# Patient Record
Sex: MALE | Race: WHITE | Employment: OTHER | URBAN - NONMETROPOLITAN AREA
[De-identification: names, ages, dates, MRNs, and addresses within clinical notes are randomized per-mention and may not be internally consistent; named-entity substitution may affect disease eponyms.]

---

## 2017-01-26 ENCOUNTER — OFFICE VISIT (OUTPATIENT)
Dept: CARDIOLOGY | Age: 72
End: 2017-01-26

## 2017-01-26 VITALS
WEIGHT: 194 LBS | BODY MASS INDEX: 29.4 KG/M2 | HEIGHT: 68 IN | DIASTOLIC BLOOD PRESSURE: 80 MMHG | SYSTOLIC BLOOD PRESSURE: 138 MMHG

## 2017-01-26 DIAGNOSIS — F17.200 SMOKER: ICD-10-CM

## 2017-01-26 DIAGNOSIS — I25.10 CORONARY ARTERY DISEASE INVOLVING NATIVE HEART WITHOUT ANGINA PECTORIS, UNSPECIFIED VESSEL OR LESION TYPE: ICD-10-CM

## 2017-01-26 DIAGNOSIS — I73.9 CLAUDICATION (HCC): Primary | ICD-10-CM

## 2017-01-26 DIAGNOSIS — I10 ESSENTIAL HYPERTENSION: ICD-10-CM

## 2017-01-26 DIAGNOSIS — Z95.1 HX OF CABG: ICD-10-CM

## 2017-01-26 DIAGNOSIS — E78.5 DYSLIPIDEMIA: ICD-10-CM

## 2017-01-26 PROCEDURE — 99213 OFFICE O/P EST LOW 20 MIN: CPT | Performed by: INTERNAL MEDICINE

## 2017-02-02 ENCOUNTER — TELEPHONE (OUTPATIENT)
Dept: CARDIOLOGY | Age: 72
End: 2017-02-02

## 2017-02-02 DIAGNOSIS — I73.9 PVD (PERIPHERAL VASCULAR DISEASE) (HCC): Primary | ICD-10-CM

## 2017-02-02 DIAGNOSIS — R68.89 ABNORMAL ANKLE BRACHIAL INDEX: ICD-10-CM

## 2017-03-06 ENCOUNTER — OFFICE VISIT (OUTPATIENT)
Dept: CARDIOLOGY | Age: 72
End: 2017-03-06

## 2017-03-06 VITALS
HEIGHT: 68 IN | BODY MASS INDEX: 29.43 KG/M2 | DIASTOLIC BLOOD PRESSURE: 78 MMHG | WEIGHT: 194.2 LBS | HEART RATE: 70 BPM | SYSTOLIC BLOOD PRESSURE: 138 MMHG

## 2017-03-06 DIAGNOSIS — E78.5 DYSLIPIDEMIA: ICD-10-CM

## 2017-03-06 DIAGNOSIS — I73.9 PAD (PERIPHERAL ARTERY DISEASE) (HCC): Primary | ICD-10-CM

## 2017-03-06 DIAGNOSIS — Z95.1 HX OF CABG: ICD-10-CM

## 2017-03-06 DIAGNOSIS — F17.200 SMOKER: ICD-10-CM

## 2017-03-06 DIAGNOSIS — I10 ESSENTIAL HYPERTENSION: ICD-10-CM

## 2017-03-06 PROCEDURE — 99406 BEHAV CHNG SMOKING 3-10 MIN: CPT | Performed by: PHYSICIAN ASSISTANT

## 2017-03-06 PROCEDURE — 99213 OFFICE O/P EST LOW 20 MIN: CPT | Performed by: PHYSICIAN ASSISTANT

## 2017-06-12 RX ORDER — CLOPIDOGREL BISULFATE 75 MG/1
TABLET ORAL
Qty: 30 TABLET | Refills: 4 | Status: SHIPPED | OUTPATIENT
Start: 2017-06-12

## 2017-10-05 ENCOUNTER — OFFICE VISIT (OUTPATIENT)
Dept: CARDIOLOGY CLINIC | Age: 72
End: 2017-10-05
Payer: MEDICARE

## 2017-10-05 VITALS
HEIGHT: 68 IN | HEART RATE: 65 BPM | WEIGHT: 197.8 LBS | BODY MASS INDEX: 29.98 KG/M2 | DIASTOLIC BLOOD PRESSURE: 70 MMHG | SYSTOLIC BLOOD PRESSURE: 132 MMHG

## 2017-10-05 DIAGNOSIS — Z87.891 EX-SMOKER: ICD-10-CM

## 2017-10-05 DIAGNOSIS — Z95.1 HX OF CABG: ICD-10-CM

## 2017-10-05 DIAGNOSIS — I10 ESSENTIAL HYPERTENSION: ICD-10-CM

## 2017-10-05 DIAGNOSIS — M17.11 PRIMARY OSTEOARTHRITIS OF RIGHT KNEE: ICD-10-CM

## 2017-10-05 DIAGNOSIS — I73.9 PAD (PERIPHERAL ARTERY DISEASE) (HCC): ICD-10-CM

## 2017-10-05 DIAGNOSIS — E78.5 DYSLIPIDEMIA: ICD-10-CM

## 2017-10-05 DIAGNOSIS — Z01.818 PRE-OPERATIVE CLEARANCE: Primary | ICD-10-CM

## 2017-10-05 DIAGNOSIS — I25.10 CORONARY ARTERY DISEASE INVOLVING NATIVE CORONARY ARTERY OF NATIVE HEART WITHOUT ANGINA PECTORIS: ICD-10-CM

## 2017-10-05 PROCEDURE — 99213 OFFICE O/P EST LOW 20 MIN: CPT | Performed by: INTERNAL MEDICINE

## 2017-10-05 PROCEDURE — 93000 ELECTROCARDIOGRAM COMPLETE: CPT | Performed by: INTERNAL MEDICINE

## 2017-10-05 NOTE — MR AVS SNAPSHOT
After Visit Summary             Haley Riojas   10/5/2017 11:00 AM   Office Visit    Description:  Male : 1945   Provider:  Corrinne Blanks, MD   Department:  [facility] [place] [address] and Future Appointments         Below is a list of your follow-up and future appointments. This may not be a complete list as you may have made appointments directly with providers that we are not aware of or your providers may have made some for you. Please call your providers to confirm appointments. It is important to keep your appointments. Please bring your current insurance card, photo ID, co-pay, and all medication bottles to your appointment. If self-pay, payment is expected at the time of service. Your To-Do List     Future Appointments Provider Department Dept Phone    2017 9:45 AM Corrinne Blanks, MD [facility] [phone]    Please arrive 15 minutes prior to appointment, bring photo ID and insurance card. Please arrive 15 minutes prior to appointment, bring photo ID and insurance card. Follow-Up    Return in about 2 months (around 2017). Information from Your Visit        Department     Name Address Phone Fax    [facility] 1  [facility].[address] [phone]      You Were Seen for:         Comments    Pre-operative clearance   [[id number]]         Vital Signs     Blood Pressure Pulse Height Weight Body Mass Index Smoking Status    132/70 65 5' 8\" (1.727 m) 197 lb 12.8 oz (89.7 kg) 30.08 kg/m2 Current Every Day Smoker      Additional Information about your Body Mass Index (BMI)           Your BMI as listed above is considered obese (30 or more). BMI is an estimate of body fat, calculated from your height and weight.   The higher your BMI, the greater your risk of heart disease, high blood pressure, type 2 diabetes, stroke, gallstones, arthritis, sleep apnea, and certain cancers. BMI is not perfect. It may overestimate body fat in athletes and people who are more muscular. Even a small weight loss (between 5 and 10 percent of your current weight) by decreasing your calorie intake and becoming more physically active will help lower your risk of developing or worsening diseases associated with obesity.      Learn more at: [email or web address]             Medications and Orders      Your Current Medications Are              clopidogrel (PLAVIX) 75 MG tablet TAKE 1 TABLET BY MOUTH DAILY    mupirocin (BACTROBAN) 2 % ointment Apply topically     Multiple Vitamins-Minerals (MULTIVITAMIN ADULTS 50+ PO) Take by mouth daily    Cholecalciferol (VITAMIN D3) 1000 UNITS CAPS Take by mouth daily    metFORMIN (GLUCOPHAGE) 500 MG tablet Take 500 mg by mouth 2 times daily     Niacin ER 1000 MG TBCR Take 2 tablets by mouth nightly     lovastatin (MEVACOR) 20 MG tablet Take 20 mg by mouth nightly    amLODIPine (NORVASC) 10 MG tablet Take 10 mg by mouth daily    aspirin 81 MG EC tablet Take 81 mg by mouth daily    lansoprazole (PREVACID SOLUTAB) 30 MG disintegrating tablet Take 30 mg by mouth every other day     metoprolol tartrate (LOPRESSOR) 50 MG tablet Take 50 mg by mouth 2 times daily Take 1/2 tab po bid     ranitidine (ZANTAC) 150 MG capsule Take 150 mg by mouth every other day       Allergies              Sulfa Antibiotics     Temp goes below normal per pt      We Ordered/Performed the following           EKG 12 Lead          Result Summary for EKG 12 Lead      Result Information     Status          Final result (Resulted: 10/5/2017)           10/5/2017  9:10 AM      Scans on Order [id number]            ECG on 10/5/2017  9:10 AM : ECG Report                     Additional Information        Basic Information     Date Of Birth Sex Race Ethnicity Preferred Language    1945 Male White Non-/Non  English      Problem List as of 10/5/2017 Peripheral vascular disease (HCC)    CAD (coronary artery disease)      Preventive Care        Date Due    One-time abdominal aortic aneurism (AAA) screening is recommended for all men between the age of 73-68 who have ever smoked 1945    Hepatitis C screening is recommended for all adults regardless of risk factors born between [facility] at least once (lifetime) who have never been tested. 1945    Tetanus Combination Vaccine (1 - Tdap) 1/31/1964    Cholesterol Screening 1/31/1985    Zoster Vaccine 1/31/2005    Pneumococcal Vaccines (two) for all adults aged 72 and over (1 of 2 - PCV13) 1/31/2010    Yearly Flu Vaccine (1) 9/1/2017    Colonoscopy 8/4/2019            MyChart Signup           [employer] allows you to send messages to your doctor, view your test results, renew your prescriptions, schedule appointments, view visit notes, and more. How Do I Sign Up? 1. In your Internet browser, go to [email or web address]  2. Click on the Sign Up Now link in the Sign In box. You will see the New Member Sign Up page. 3. Enter your [employer] Access Code exactly as it appears below. You will not need to use this code after youve completed the sign-up process. If you do not sign up before the expiration date, you must request a new code. [employer] Access Code: [email or web address]  Expires: 12/4/2017  9:06 AM    4. Enter your Social Security Number ([id number]) and Date of Birth (mm/dd/yyyy) as indicated and click Submit. You will be taken to the next sign-up page. 5. Create a [employer] ID. This will be your [employer] login ID and cannot be changed, so think of one that is secure and easy to remember. 6. Create a [employer] password. You can change your password at any time. 7. Enter your Password Reset Question and Answer. This can be used at a later time if you forget your password. 8. Enter your e-mail address.  You will receive e-mail notification when new information is available in [email or web address]. 9. Click Sign Up. You can now view your medical record. Additional Information  If you have questions, please contact the physician practice where you receive care. Remember, [email or web address] is NOT to be used for urgent needs. For medical emergencies, dial 911. For questions regarding your [email or web address] account call [phone]. If you have a clinical question, please call your doctor's office.

## 2017-10-05 NOTE — PROGRESS NOTES
This patient is followed regularly by Dr. Laurie Roe MD.      Chief Complaint   Patient presents with    Follow-up     9 month f/u    Cardiac Clearance     right knee Dr. Yumi Lindsey Oct 16         Current Outpatient Prescriptions   Medication Sig Dispense Refill    clopidogrel (PLAVIX) 75 MG tablet TAKE 1 TABLET BY MOUTH DAILY 30 tablet 4    mupirocin (BACTROBAN) 2 % ointment Apply topically       Multiple Vitamins-Minerals (MULTIVITAMIN ADULTS 50+ PO) Take by mouth daily      Cholecalciferol (VITAMIN D3) 1000 UNITS CAPS Take by mouth daily      metFORMIN (GLUCOPHAGE) 500 MG tablet Take 500 mg by mouth 2 times daily       Niacin ER 1000 MG TBCR Take 2 tablets by mouth nightly       lovastatin (MEVACOR) 20 MG tablet Take 20 mg by mouth nightly      amLODIPine (NORVASC) 10 MG tablet Take 10 mg by mouth daily      aspirin 81 MG EC tablet Take 81 mg by mouth daily      lansoprazole (PREVACID SOLUTAB) 30 MG disintegrating tablet Take 30 mg by mouth every other day       metoprolol tartrate (LOPRESSOR) 50 MG tablet Take 50 mg by mouth 2 times daily Take 1/2 tab po bid       ranitidine (ZANTAC) 150 MG capsule Take 150 mg by mouth every other day        No current facility-administered medications for this visit. Review of Systems - General ROS: negative  Psychological ROS: negative  Hematological and Lymphatic ROS: No history of blood clots or bleeding disorder.    Respiratory ROS: no cough, shortness of breath, or wheezing  Cardiovascular ROS: no chest pain or dyspnea on exertion  Gastrointestinal ROS: negative  Genito-Urinary ROS: no dysuria, trouble voiding, or hematuria  Musculoskeletal ROS: rt knee pain  Neurological ROS: no TIA or stroke symptoms  Dermatological ROS: negative      /70  Pulse 65  Ht 5' 8\" (1.727 m)  Wt 197 lb 12.8 oz (89.7 kg)  BMI 30.08 kg/m2      Physical Examination: General appearance - alert, well appearing, and in no distress  Mental status - alert, oriented to

## 2017-12-21 ENCOUNTER — OFFICE VISIT (OUTPATIENT)
Dept: CARDIOLOGY CLINIC | Age: 72
End: 2017-12-21
Payer: MEDICARE

## 2017-12-21 VITALS
HEART RATE: 71 BPM | WEIGHT: 192 LBS | HEIGHT: 68 IN | SYSTOLIC BLOOD PRESSURE: 137 MMHG | BODY MASS INDEX: 29.1 KG/M2 | DIASTOLIC BLOOD PRESSURE: 72 MMHG

## 2017-12-21 DIAGNOSIS — I73.9 CLAUDICATION (HCC): Primary | ICD-10-CM

## 2017-12-21 DIAGNOSIS — Z95.1 HX OF CABG: ICD-10-CM

## 2017-12-21 DIAGNOSIS — E78.5 DYSLIPIDEMIA: ICD-10-CM

## 2017-12-21 DIAGNOSIS — Z87.891 EX-SMOKER: ICD-10-CM

## 2017-12-21 DIAGNOSIS — I10 ESSENTIAL HYPERTENSION: ICD-10-CM

## 2017-12-21 DIAGNOSIS — I73.9 PAD (PERIPHERAL ARTERY DISEASE) (HCC): ICD-10-CM

## 2017-12-21 DIAGNOSIS — I25.10 CORONARY ARTERY DISEASE INVOLVING NATIVE CORONARY ARTERY OF NATIVE HEART WITHOUT ANGINA PECTORIS: ICD-10-CM

## 2017-12-21 PROCEDURE — 99213 OFFICE O/P EST LOW 20 MIN: CPT | Performed by: INTERNAL MEDICINE

## 2017-12-21 NOTE — PROGRESS NOTES
Patient complains of leg cramps. No chest pain, no shortness of breath, no edema, no palpitations, no vertigo.

## 2017-12-21 NOTE — PROGRESS NOTES
This patient is followed regularly by Dr. Olman Garcia MD.      Chief Complaint   Patient presents with    Coronary Artery Disease     2 mth follow up         Current Outpatient Prescriptions   Medication Sig Dispense Refill    clopidogrel (PLAVIX) 75 MG tablet TAKE 1 TABLET BY MOUTH DAILY 30 tablet 4    mupirocin (BACTROBAN) 2 % ointment Apply topically       Multiple Vitamins-Minerals (MULTIVITAMIN ADULTS 50+ PO) Take by mouth daily      Cholecalciferol (VITAMIN D3) 1000 UNITS CAPS Take by mouth daily      metFORMIN (GLUCOPHAGE) 500 MG tablet Take 500 mg by mouth 2 times daily       Niacin ER 1000 MG TBCR Take 2 tablets by mouth nightly       lovastatin (MEVACOR) 20 MG tablet Take 20 mg by mouth nightly      amLODIPine (NORVASC) 10 MG tablet Take 10 mg by mouth daily      aspirin 81 MG EC tablet Take 81 mg by mouth daily      lansoprazole (PREVACID SOLUTAB) 30 MG disintegrating tablet Take 30 mg by mouth every other day       metoprolol tartrate (LOPRESSOR) 50 MG tablet Take 50 mg by mouth 2 times daily Take 1/2 tab po bid       ranitidine (ZANTAC) 150 MG capsule Take 150 mg by mouth every other day        No current facility-administered medications for this visit. Review of Systems - General ROS: negative  Psychological ROS: negative  Hematological and Lymphatic ROS: No history of blood clots or bleeding disorder.    Respiratory ROS: no cough, shortness of breath, or wheezing  Cardiovascular ROS: no chest pain or dyspnea on exertion  Gastrointestinal ROS: negative  Genito-Urinary ROS: no dysuria, trouble voiding, or hematuria  Musculoskeletal ROS: negative  Neurological ROS: no TIA or stroke symptoms  Dermatological ROS: negative  C/o claudication       /72   Pulse 71   Ht 5' 8\" (1.727 m)   Wt 192 lb (87.1 kg)   BMI 29.19 kg/m²       Physical Examination: General appearance - alert, well appearing, and in no distress  Mental status - alert, oriented to person, place, and Assessment/Plan:      S/P knee surgery   No lana op cardiac events. Peripheral vascular disease  C/o claudication  H/o intervention with 6.0-40 mm stent insertion and angioplasty for right SFA 90% proximal lesion. Get CALDERON    Coronary artery disease  CABG  Seems to be stable. Denies angina or change in breathing pattern. Continue ASA/BB/Statin/norvasc/plavix. Hypertension, on medical treatment. Seems to be under good control. Patient is compliant with medical treatment. Dyslipidemia: On statin, followed periodically. Patient need periodic lipid and liver profile. Patient is a advised to have their lipids and liver panel checked through family doctor. The therapeutic values that need to be met are also presented to the patient and need to achieve them is emphasized and patient agrees and accepts. Will schedule follow up appointment in 1 year.

## 2017-12-26 ENCOUNTER — TELEPHONE (OUTPATIENT)
Dept: CARDIOLOGY CLINIC | Age: 72
End: 2017-12-26

## 2017-12-26 NOTE — TELEPHONE ENCOUNTER
Pt no showed harris's   Pt called, states didn't know about test, state had no phone call or letter informing, also states told someone didn't want test til after first of year    No notes on order stating after 1st of year  Notes did say tried to contact pt but no voice mail set up and instructions were mailed to pt    harris rescheduled to 01-04-18 @ 10:00am  Pt on line when scheduled, instructions given.

## 2018-01-04 ENCOUNTER — HOSPITAL ENCOUNTER (OUTPATIENT)
Dept: INTERVENTIONAL RADIOLOGY/VASCULAR | Age: 73
Discharge: HOME | End: 2018-01-04
Payer: MEDICARE

## 2018-01-04 DIAGNOSIS — I73.9 CLAUDICATION (HCC): ICD-10-CM

## 2018-01-04 PROCEDURE — 93925 LOWER EXTREMITY STUDY: CPT

## 2018-01-15 ENCOUNTER — TELEPHONE (OUTPATIENT)
Dept: CARDIOLOGY CLINIC | Age: 73
End: 2018-01-15

## 2018-01-17 ENCOUNTER — OFFICE VISIT (OUTPATIENT)
Dept: CARDIOLOGY CLINIC | Age: 73
End: 2018-01-17
Payer: MEDICARE

## 2018-01-17 VITALS
SYSTOLIC BLOOD PRESSURE: 124 MMHG | HEART RATE: 88 BPM | BODY MASS INDEX: 31.02 KG/M2 | WEIGHT: 204 LBS | DIASTOLIC BLOOD PRESSURE: 64 MMHG

## 2018-01-17 DIAGNOSIS — E78.5 DYSLIPIDEMIA: ICD-10-CM

## 2018-01-17 DIAGNOSIS — I25.10 CORONARY ARTERY DISEASE INVOLVING NATIVE CORONARY ARTERY OF NATIVE HEART WITHOUT ANGINA PECTORIS: ICD-10-CM

## 2018-01-17 DIAGNOSIS — I73.9 CLAUDICATION (HCC): Primary | ICD-10-CM

## 2018-01-17 DIAGNOSIS — I10 ESSENTIAL HYPERTENSION: ICD-10-CM

## 2018-01-17 DIAGNOSIS — I73.9 PAD (PERIPHERAL ARTERY DISEASE) (HCC): ICD-10-CM

## 2018-01-17 PROCEDURE — G8598 ASA/ANTIPLAT THER USED: HCPCS | Performed by: INTERNAL MEDICINE

## 2018-01-17 PROCEDURE — 1123F ACP DISCUSS/DSCN MKR DOCD: CPT | Performed by: INTERNAL MEDICINE

## 2018-01-17 PROCEDURE — 99213 OFFICE O/P EST LOW 20 MIN: CPT | Performed by: INTERNAL MEDICINE

## 2018-01-17 PROCEDURE — G8484 FLU IMMUNIZE NO ADMIN: HCPCS | Performed by: INTERNAL MEDICINE

## 2018-01-17 PROCEDURE — 3017F COLORECTAL CA SCREEN DOC REV: CPT | Performed by: INTERNAL MEDICINE

## 2018-01-17 PROCEDURE — G8427 DOCREV CUR MEDS BY ELIG CLIN: HCPCS | Performed by: INTERNAL MEDICINE

## 2018-01-17 PROCEDURE — G8417 CALC BMI ABV UP PARAM F/U: HCPCS | Performed by: INTERNAL MEDICINE

## 2018-01-17 PROCEDURE — 4040F PNEUMOC VAC/ADMIN/RCVD: CPT | Performed by: INTERNAL MEDICINE

## 2018-01-17 PROCEDURE — 4004F PT TOBACCO SCREEN RCVD TLK: CPT | Performed by: INTERNAL MEDICINE

## 2018-01-24 ENCOUNTER — TELEPHONE (OUTPATIENT)
Dept: CARDIOLOGY CLINIC | Age: 73
End: 2018-01-24

## 2018-01-24 DIAGNOSIS — I25.10 CORONARY ARTERY DISEASE INVOLVING NATIVE HEART WITHOUT ANGINA PECTORIS, UNSPECIFIED VESSEL OR LESION TYPE: ICD-10-CM

## 2018-01-24 DIAGNOSIS — I73.9 PERIPHERAL VASCULAR DISEASE (HCC): Primary | ICD-10-CM

## 2018-02-06 RX ORDER — SODIUM CHLORIDE 450 MG/100ML
INJECTION, SOLUTION INTRAVENOUS CONTINUOUS
Status: CANCELLED | OUTPATIENT
Start: 2018-02-06

## 2018-02-06 RX ORDER — MIDAZOLAM HYDROCHLORIDE 1 MG/ML
1 INJECTION INTRAMUSCULAR; INTRAVENOUS ONCE
Status: CANCELLED | OUTPATIENT
Start: 2018-02-06 | End: 2018-02-06

## 2018-02-06 RX ORDER — FENTANYL CITRATE 50 UG/ML
50 INJECTION, SOLUTION INTRAMUSCULAR; INTRAVENOUS ONCE
Status: CANCELLED | OUTPATIENT
Start: 2018-02-06 | End: 2018-02-06

## 2018-02-07 ENCOUNTER — HOSPITAL ENCOUNTER (OUTPATIENT)
Dept: INTERVENTIONAL RADIOLOGY/VASCULAR | Age: 73
Discharge: HOME | End: 2018-02-07
Payer: MEDICARE

## 2018-02-07 ENCOUNTER — HOSPITAL ENCOUNTER (OUTPATIENT)
Dept: INPATIENT UNIT | Age: 73
Discharge: HOME | End: 2018-02-08
Attending: INTERNAL MEDICINE | Admitting: INTERNAL MEDICINE
Payer: MEDICARE

## 2018-02-07 VITALS
RESPIRATION RATE: 17 BRPM | HEART RATE: 69 BPM | OXYGEN SATURATION: 95 % | DIASTOLIC BLOOD PRESSURE: 88 MMHG | SYSTOLIC BLOOD PRESSURE: 137 MMHG

## 2018-02-07 DIAGNOSIS — I70.219 ATHEROSCLEROTIC PVD WITH INTERMITTENT CLAUDICATION (HCC): ICD-10-CM

## 2018-02-07 DIAGNOSIS — I73.9 PERIPHERAL VASCULAR DISEASE (HCC): ICD-10-CM

## 2018-02-07 PROBLEM — R93.89: Status: ACTIVE | Noted: 2018-02-07

## 2018-02-07 LAB
ABO: NORMAL
ACTIVATED CLOTTING TIME: 153 SECONDS (ref 1–150)
ACTIVATED CLOTTING TIME: 180 SECONDS (ref 1–150)
ANTIBODY SCREEN: NORMAL
APTT: 34.3 SECONDS (ref 22–38)
CREAT SERPL-MCNC: 0.7 MG/DL (ref 0.4–1.2)
GFR SERPL CREATININE-BSD FRML MDRD: > 90 ML/MIN/1.73M2
HCT VFR BLD CALC: 40 % (ref 42–52)
HEMOGLOBIN: 14.1 GM/DL (ref 14–18)
INR BLD: 1.02 (ref 0.85–1.13)
MCH RBC QN AUTO: 34.4 PG (ref 27–31)
MCHC RBC AUTO-ENTMCNC: 35.2 GM/DL (ref 33–37)
MCV RBC AUTO: 97.5 FL (ref 80–94)
PDW BLD-RTO: 13.3 % (ref 11.5–14.5)
PLATELET # BLD: 138 THOU/MM3 (ref 130–400)
PMV BLD AUTO: 10.5 FL (ref 7.4–10.4)
RBC # BLD: 4.1 MILL/MM3 (ref 4.7–6.1)
RH FACTOR: NORMAL
WBC # BLD: 8.5 THOU/MM3 (ref 4.8–10.8)

## 2018-02-07 PROCEDURE — 2580000003 HC RX 258: Performed by: INTERNAL MEDICINE

## 2018-02-07 PROCEDURE — 36415 COLL VENOUS BLD VENIPUNCTURE: CPT

## 2018-02-07 PROCEDURE — 2720000010 HC SURG SUPPLY STERILE

## 2018-02-07 PROCEDURE — 37224 HC PLASTY UNI FEMPOP: CPT | Performed by: INTERNAL MEDICINE

## 2018-02-07 PROCEDURE — 2500000003 HC RX 250 WO HCPCS

## 2018-02-07 PROCEDURE — 86901 BLOOD TYPING SEROLOGIC RH(D): CPT

## 2018-02-07 PROCEDURE — 85027 COMPLETE CBC AUTOMATED: CPT

## 2018-02-07 PROCEDURE — 2500000003 HC RX 250 WO HCPCS: Performed by: INTERNAL MEDICINE

## 2018-02-07 PROCEDURE — C1894 INTRO/SHEATH, NON-LASER: HCPCS

## 2018-02-07 PROCEDURE — 86850 RBC ANTIBODY SCREEN: CPT

## 2018-02-07 PROCEDURE — 6370000000 HC RX 637 (ALT 250 FOR IP): Performed by: INTERNAL MEDICINE

## 2018-02-07 PROCEDURE — 37224 PR REVSC OPN/PRG FEM/POP W/ANGIOPLASTY UNI: CPT | Performed by: INTERNAL MEDICINE

## 2018-02-07 PROCEDURE — 75630 X-RAY AORTA LEG ARTERIES: CPT | Performed by: INTERNAL MEDICINE

## 2018-02-07 PROCEDURE — 6360000002 HC RX W HCPCS: Performed by: INTERNAL MEDICINE

## 2018-02-07 PROCEDURE — 85347 COAGULATION TIME ACTIVATED: CPT

## 2018-02-07 PROCEDURE — C1769 GUIDE WIRE: HCPCS

## 2018-02-07 PROCEDURE — 86900 BLOOD TYPING SEROLOGIC ABO: CPT

## 2018-02-07 PROCEDURE — C2623 CATH, TRANSLUMIN, DRUG-COAT: HCPCS

## 2018-02-07 PROCEDURE — 75710 ARTERY X-RAYS ARM/LEG: CPT | Performed by: INTERNAL MEDICINE

## 2018-02-07 PROCEDURE — 85610 PROTHROMBIN TIME: CPT

## 2018-02-07 PROCEDURE — A6258 TRANSPARENT FILM >16<=48 IN: HCPCS

## 2018-02-07 PROCEDURE — 82565 ASSAY OF CREATININE: CPT

## 2018-02-07 PROCEDURE — 6360000002 HC RX W HCPCS

## 2018-02-07 PROCEDURE — 6370000000 HC RX 637 (ALT 250 FOR IP)

## 2018-02-07 PROCEDURE — 6360000004 HC RX CONTRAST MEDICATION: Performed by: INTERNAL MEDICINE

## 2018-02-07 PROCEDURE — 85730 THROMBOPLASTIN TIME PARTIAL: CPT

## 2018-02-07 RX ORDER — ASPIRIN 81 MG/1
81 TABLET ORAL DAILY
Status: CANCELLED | OUTPATIENT
Start: 2018-02-07

## 2018-02-07 RX ORDER — AMLODIPINE BESYLATE 10 MG/1
10 TABLET ORAL DAILY
Status: DISCONTINUED | OUTPATIENT
Start: 2018-02-07 | End: 2018-02-08 | Stop reason: HOSPADM

## 2018-02-07 RX ORDER — SODIUM CHLORIDE 0.9 % (FLUSH) 0.9 %
10 SYRINGE (ML) INJECTION EVERY 12 HOURS SCHEDULED
Status: CANCELLED | OUTPATIENT
Start: 2018-02-07

## 2018-02-07 RX ORDER — FENTANYL CITRATE 50 UG/ML
25 INJECTION, SOLUTION INTRAMUSCULAR; INTRAVENOUS ONCE
Status: COMPLETED | OUTPATIENT
Start: 2018-02-07 | End: 2018-02-07

## 2018-02-07 RX ORDER — SODIUM CHLORIDE 450 MG/100ML
INJECTION, SOLUTION INTRAVENOUS CONTINUOUS
Status: DISCONTINUED | OUTPATIENT
Start: 2018-02-07 | End: 2018-02-07 | Stop reason: CLARIF

## 2018-02-07 RX ORDER — ATROPINE SULFATE 0.4 MG/ML
0.5 AMPUL (ML) INJECTION
Status: CANCELLED | OUTPATIENT
Start: 2018-02-07 | End: 2018-02-07

## 2018-02-07 RX ORDER — SODIUM CHLORIDE 0.9 % (FLUSH) 0.9 %
10 SYRINGE (ML) INJECTION PRN
Status: CANCELLED | OUTPATIENT
Start: 2018-02-07

## 2018-02-07 RX ORDER — METOPROLOL TARTRATE 50 MG/1
50 TABLET, FILM COATED ORAL 2 TIMES DAILY
Status: CANCELLED | OUTPATIENT
Start: 2018-02-07

## 2018-02-07 RX ORDER — ATROPINE SULFATE 0.4 MG/ML
0.5 AMPUL (ML) INJECTION
Status: ACTIVE | OUTPATIENT
Start: 2018-02-07 | End: 2018-02-07

## 2018-02-07 RX ORDER — HEPARIN SODIUM 1000 [USP'U]/ML
5000 INJECTION, SOLUTION INTRAVENOUS; SUBCUTANEOUS ONCE
Status: COMPLETED | OUTPATIENT
Start: 2018-02-07 | End: 2018-02-07

## 2018-02-07 RX ORDER — SODIUM CHLORIDE 9 MG/ML
75 INJECTION, SOLUTION INTRAVENOUS CONTINUOUS
Status: CANCELLED | OUTPATIENT
Start: 2018-02-07 | End: 2018-02-08

## 2018-02-07 RX ORDER — MIDAZOLAM HYDROCHLORIDE 1 MG/ML
1 INJECTION INTRAMUSCULAR; INTRAVENOUS ONCE
Status: DISCONTINUED | OUTPATIENT
Start: 2018-02-07 | End: 2018-02-07 | Stop reason: CLARIF

## 2018-02-07 RX ORDER — MIDAZOLAM HYDROCHLORIDE 1 MG/ML
1 INJECTION INTRAMUSCULAR; INTRAVENOUS ONCE
Status: COMPLETED | OUTPATIENT
Start: 2018-02-07 | End: 2018-02-07

## 2018-02-07 RX ORDER — AMLODIPINE BESYLATE 10 MG/1
10 TABLET ORAL DAILY
Status: CANCELLED | OUTPATIENT
Start: 2018-02-07

## 2018-02-07 RX ORDER — NIACIN 500 MG/1
2000 TABLET, EXTENDED RELEASE ORAL NIGHTLY
Status: DISCONTINUED | OUTPATIENT
Start: 2018-02-07 | End: 2018-02-08 | Stop reason: HOSPADM

## 2018-02-07 RX ORDER — SODIUM CHLORIDE 9 MG/ML
INJECTION, SOLUTION INTRAVENOUS CONTINUOUS
Status: DISCONTINUED | OUTPATIENT
Start: 2018-02-07 | End: 2018-02-07 | Stop reason: SDUPTHER

## 2018-02-07 RX ORDER — FENTANYL CITRATE 50 UG/ML
50 INJECTION, SOLUTION INTRAMUSCULAR; INTRAVENOUS ONCE
Status: COMPLETED | OUTPATIENT
Start: 2018-02-07 | End: 2018-02-07

## 2018-02-07 RX ORDER — LOVASTATIN 20 MG/1
20 TABLET ORAL NIGHTLY
Status: CANCELLED | OUTPATIENT
Start: 2018-02-07

## 2018-02-07 RX ORDER — ACETAMINOPHEN 325 MG/1
650 TABLET ORAL EVERY 4 HOURS PRN
Status: CANCELLED | OUTPATIENT
Start: 2018-02-07

## 2018-02-07 RX ORDER — SODIUM CHLORIDE 0.9 % (FLUSH) 0.9 %
10 SYRINGE (ML) INJECTION EVERY 12 HOURS SCHEDULED
Status: DISCONTINUED | OUTPATIENT
Start: 2018-02-07 | End: 2018-02-08 | Stop reason: HOSPADM

## 2018-02-07 RX ORDER — CLOPIDOGREL BISULFATE 75 MG/1
1 TABLET ORAL DAILY
Status: CANCELLED | OUTPATIENT
Start: 2018-02-07

## 2018-02-07 RX ORDER — SODIUM CHLORIDE 0.9 % (FLUSH) 0.9 %
10 SYRINGE (ML) INJECTION PRN
Status: DISCONTINUED | OUTPATIENT
Start: 2018-02-07 | End: 2018-02-07 | Stop reason: SDUPTHER

## 2018-02-07 RX ORDER — ONDANSETRON 2 MG/ML
4 INJECTION INTRAMUSCULAR; INTRAVENOUS EVERY 6 HOURS PRN
Status: CANCELLED | OUTPATIENT
Start: 2018-02-07

## 2018-02-07 RX ORDER — MORPHINE SULFATE 2 MG/ML
2 INJECTION, SOLUTION INTRAMUSCULAR; INTRAVENOUS
Status: CANCELLED | OUTPATIENT
Start: 2018-02-07 | End: 2018-02-07

## 2018-02-07 RX ORDER — DIAPER,BRIEF,INFANT-TODD,DISP
EACH MISCELLANEOUS ONCE
Status: DISCONTINUED | OUTPATIENT
Start: 2018-02-07 | End: 2018-02-08 | Stop reason: HOSPADM

## 2018-02-07 RX ORDER — SODIUM CHLORIDE 0.9 % (FLUSH) 0.9 %
10 SYRINGE (ML) INJECTION EVERY 12 HOURS SCHEDULED
Status: DISCONTINUED | OUTPATIENT
Start: 2018-02-07 | End: 2018-02-07 | Stop reason: SDUPTHER

## 2018-02-07 RX ORDER — ASPIRIN 81 MG/1
81 TABLET ORAL DAILY
Status: DISCONTINUED | OUTPATIENT
Start: 2018-02-07 | End: 2018-02-08 | Stop reason: HOSPADM

## 2018-02-07 RX ORDER — OXYCODONE HYDROCHLORIDE AND ACETAMINOPHEN 5; 325 MG/1; MG/1
2 TABLET ORAL EVERY 4 HOURS PRN
Status: DISCONTINUED | OUTPATIENT
Start: 2018-02-07 | End: 2018-02-08 | Stop reason: HOSPADM

## 2018-02-07 RX ORDER — ONDANSETRON 2 MG/ML
4 INJECTION INTRAMUSCULAR; INTRAVENOUS EVERY 6 HOURS PRN
Status: DISCONTINUED | OUTPATIENT
Start: 2018-02-07 | End: 2018-02-08 | Stop reason: HOSPADM

## 2018-02-07 RX ORDER — METOPROLOL TARTRATE 50 MG/1
50 TABLET, FILM COATED ORAL 2 TIMES DAILY
Status: DISCONTINUED | OUTPATIENT
Start: 2018-02-07 | End: 2018-02-07

## 2018-02-07 RX ORDER — MORPHINE SULFATE 2 MG/ML
2 INJECTION, SOLUTION INTRAMUSCULAR; INTRAVENOUS
Status: ACTIVE | OUTPATIENT
Start: 2018-02-07 | End: 2018-02-07

## 2018-02-07 RX ORDER — FENTANYL CITRATE 50 UG/ML
50 INJECTION, SOLUTION INTRAMUSCULAR; INTRAVENOUS ONCE
Status: DISCONTINUED | OUTPATIENT
Start: 2018-02-07 | End: 2018-02-07 | Stop reason: CLARIF

## 2018-02-07 RX ORDER — SODIUM CHLORIDE 9 MG/ML
75 INJECTION, SOLUTION INTRAVENOUS CONTINUOUS
Status: DISCONTINUED | OUTPATIENT
Start: 2018-02-07 | End: 2018-02-08 | Stop reason: HOSPADM

## 2018-02-07 RX ORDER — CLOPIDOGREL BISULFATE 75 MG/1
75 TABLET ORAL DAILY
Status: DISCONTINUED | OUTPATIENT
Start: 2018-02-07 | End: 2018-02-08 | Stop reason: HOSPADM

## 2018-02-07 RX ORDER — ACETAMINOPHEN 325 MG/1
650 TABLET ORAL EVERY 4 HOURS PRN
Status: DISCONTINUED | OUTPATIENT
Start: 2018-02-07 | End: 2018-02-08 | Stop reason: HOSPADM

## 2018-02-07 RX ORDER — SODIUM CHLORIDE 9 MG/ML
75 INJECTION, SOLUTION INTRAVENOUS CONTINUOUS
Status: DISCONTINUED | OUTPATIENT
Start: 2018-02-07 | End: 2018-02-07 | Stop reason: SDUPTHER

## 2018-02-07 RX ORDER — NIACIN 1000 MG
2 TABLET, EXTENDED RELEASE ORAL NIGHTLY
Status: CANCELLED | OUTPATIENT
Start: 2018-02-07

## 2018-02-07 RX ORDER — SODIUM CHLORIDE 0.9 % (FLUSH) 0.9 %
10 SYRINGE (ML) INJECTION PRN
Status: DISCONTINUED | OUTPATIENT
Start: 2018-02-07 | End: 2018-02-08 | Stop reason: HOSPADM

## 2018-02-07 RX ADMIN — MIDAZOLAM 1 MG: 1 INJECTION INTRAMUSCULAR; INTRAVENOUS at 09:02

## 2018-02-07 RX ADMIN — AMLODIPINE BESYLATE 10 MG: 10 TABLET ORAL at 22:15

## 2018-02-07 RX ADMIN — SODIUM CHLORIDE: 9 INJECTION, SOLUTION INTRAVENOUS at 11:17

## 2018-02-07 RX ADMIN — Medication 500 MCG: at 10:11

## 2018-02-07 RX ADMIN — NIACIN 2000 MG: 500 TABLET, EXTENDED RELEASE ORAL at 23:45

## 2018-02-07 RX ADMIN — LANSOPRAZOLE 30 MG: 30 TABLET, ORALLY DISINTEGRATING, DELAYED RELEASE ORAL at 23:45

## 2018-02-07 RX ADMIN — FENTANYL CITRATE 25 MCG: 50 INJECTION INTRAMUSCULAR; INTRAVENOUS at 09:52

## 2018-02-07 RX ADMIN — FENTANYL CITRATE 50 MCG: 50 INJECTION INTRAMUSCULAR; INTRAVENOUS at 09:15

## 2018-02-07 RX ADMIN — FENTANYL CITRATE 25 MCG: 50 INJECTION INTRAMUSCULAR; INTRAVENOUS at 09:02

## 2018-02-07 RX ADMIN — HEPARIN SODIUM 5000 UNITS: 1000 INJECTION, SOLUTION INTRAVENOUS; SUBCUTANEOUS at 09:53

## 2018-02-07 RX ADMIN — CLOPIDOGREL BISULFATE 75 MG: 75 TABLET, FILM COATED ORAL at 22:16

## 2018-02-07 RX ADMIN — FENTANYL CITRATE 25 MCG: 50 INJECTION INTRAMUSCULAR; INTRAVENOUS at 09:36

## 2018-02-07 RX ADMIN — FENTANYL CITRATE 25 MCG: 50 INJECTION INTRAMUSCULAR; INTRAVENOUS at 08:40

## 2018-02-07 RX ADMIN — OXYCODONE HYDROCHLORIDE AND ACETAMINOPHEN 2 TABLET: 5; 325 TABLET ORAL at 23:43

## 2018-02-07 RX ADMIN — IOVERSOL 125 ML: 678 INJECTION INTRA-ARTERIAL; INTRAVENOUS at 10:30

## 2018-02-07 RX ADMIN — MIDAZOLAM 1 MG: 1 INJECTION INTRAMUSCULAR; INTRAVENOUS at 09:52

## 2018-02-07 RX ADMIN — MIDAZOLAM 1 MG: 1 INJECTION INTRAMUSCULAR; INTRAVENOUS at 08:40

## 2018-02-07 RX ADMIN — MIDAZOLAM 1 MG: 1 INJECTION INTRAMUSCULAR; INTRAVENOUS at 09:36

## 2018-02-07 RX ADMIN — Medication 10 ML: at 22:07

## 2018-02-07 RX ADMIN — FENTANYL CITRATE 25 MCG: 50 INJECTION INTRAMUSCULAR; INTRAVENOUS at 09:09

## 2018-02-07 RX ADMIN — OXYCODONE HYDROCHLORIDE AND ACETAMINOPHEN 1 TABLET: 5; 325 TABLET ORAL at 17:17

## 2018-02-07 RX ADMIN — ASPIRIN 81 MG: 81 TABLET ORAL at 22:15

## 2018-02-07 RX ADMIN — SODIUM CHLORIDE: 9 INJECTION, SOLUTION INTRAVENOUS at 07:01

## 2018-02-07 RX ADMIN — MIDAZOLAM 1 MG: 1 INJECTION INTRAMUSCULAR; INTRAVENOUS at 09:07

## 2018-02-07 RX ADMIN — SODIUM CHLORIDE 75 ML/HR: 9 INJECTION, SOLUTION INTRAVENOUS at 22:07

## 2018-02-07 ASSESSMENT — PAIN SCALES - GENERAL
PAINLEVEL_OUTOF10: 0
PAINLEVEL_OUTOF10: 6
PAINLEVEL_OUTOF10: 0
PAINLEVEL_OUTOF10: 6
PAINLEVEL_OUTOF10: 0

## 2018-02-07 NOTE — FLOWSHEET NOTE
Returned to [phone] from [facility] with sheath in left groin w/o s/s of bleeding or hematoma. Good circulation/sensation to left groin, leg, and foot. Good circulation/sensation to right foot and toes. Iv w/o s/s of infiltration.

## 2018-02-07 NOTE — PROGRESS NOTES
0815 Pt arrives to radiology special procedures room 1 for angiogram  0817 Pt identified, procedure explained, consent signed  0820 transferred to procedure table  0835 Dr. Dianelys Fajardo in and assessed the pt  [phone] procedure started  0957 pulse ox 87 %- above 90% with encouraging to take deep breaths. But sats down to 87 again when pt back to sleep. Oxygen applied at 2L/min NC and pulse ox quickly up to 94%  0959 Angioplasty to right proximal SFA with 6 mm x 20 cutting balloon. 1005 Angioplasty to right proximal SFA with 6 x 60 x 130 balloon. 1025 left external iliac pressure = 114/55. Left common femoral pressure = 100/58. 1027 Procedure completed. 1028 ACT drawn. 1032 ACT = 180  1040 Pressurized fluid to left femoral sheath. Sterile occlusive dressing to sheath site. Site is without bleeding or bruising. 1045 Report called to Bi Anderson on 2E.  1050 Pt transferred back to 2E per bed.

## 2018-02-07 NOTE — PROGRESS NOTES
1233 Sheath in left femoral artery pulled and manual pressure applied with quick clot per Richie Bacon RN. No bleeding or hematoma noted. 1248 Manual pressure removed. No bleeding or hematoma noted. 4 x 4 with op-site dressing applied. 1249 Bleeding at site noted. Pressure re-applied.

## 2018-02-08 ENCOUNTER — TELEPHONE (OUTPATIENT)
Dept: CARDIOLOGY CLINIC | Age: 73
End: 2018-02-08

## 2018-02-08 VITALS
RESPIRATION RATE: 16 BRPM | DIASTOLIC BLOOD PRESSURE: 62 MMHG | HEART RATE: 77 BPM | OXYGEN SATURATION: 91 % | SYSTOLIC BLOOD PRESSURE: 128 MMHG | HEIGHT: 68 IN | WEIGHT: 197 LBS | BODY MASS INDEX: 29.86 KG/M2 | TEMPERATURE: 97.6 F

## 2018-02-08 PROCEDURE — 2580000003 HC RX 258: Performed by: INTERNAL MEDICINE

## 2018-02-08 RX ADMIN — Medication 10 ML: at 08:43

## 2018-02-08 RX ADMIN — Medication 25 MG: at 08:42

## 2018-02-08 RX ADMIN — ASPIRIN 81 MG: 81 TABLET ORAL at 08:41

## 2018-02-08 RX ADMIN — CLOPIDOGREL BISULFATE 75 MG: 75 TABLET, FILM COATED ORAL at 08:41

## 2018-02-08 RX ADMIN — AMLODIPINE BESYLATE 10 MG: 10 TABLET ORAL at 08:41

## 2018-02-08 ASSESSMENT — PAIN SCALES - GENERAL: PAINLEVEL_OUTOF10: 1

## 2018-02-08 NOTE — FLOWSHEET NOTE
Transferred to 4B13 per bed per transport team with personal belongings   Family present   Left femoral Site remains without bleeding or hematoma

## 2018-02-08 NOTE — PROGRESS NOTES
Discharge order placed. Patient aware. IV taken out, telelmetry taken off, cleaned, and placed in nurses station [facility]. All belongings sent home with patient including all of patient's home medications. AVS discussed with patient. Importance of keeping follow up appointments discussed. Patient and family state understanding. Patient instructed to wait 48 hours before resuming Metformin-written on AVS. AVS discussed with patient and family, they state they have no questions at the present time. Signs of increasing hematoma discussed with family and patient, he states he will monitor.

## 2018-02-08 NOTE — PROGRESS NOTES
02/08/2018 @ 1509: Follow up phone call made and unable to reach patient at this time. 02/08/2018 @ 1512: Pt returned \"missed call\" and stated he is doing well with no pain or problems with site.

## 2018-02-08 NOTE — CARE COORDINATION
2/8/18, 7:58 AM      Ray Nguyen       Admitted from: 2E 2/7/2018/ [address] day: 0   Location: [address] [place] [address]-A Reason for admit: Abnormal arteriogram [R93.8] Status: OP in a bed  Admit order signed?: yes  PMH:  has a past medical history of CAD (coronary artery disease); Diabetes mellitus ([address] GERD (gastroesophageal reflux disease); Hyperlipidemia; and Hypertension. Procedure:   2/7 Lower ext angiogram, PTA of rt sfa  Pertinent abnormal Imaging:none  Medications:  Scheduled Meds:   bacitracin zinc   Topical Once    sodium chloride flush  10 mL Intravenous 2 times per day    amLODIPine  10 mg Oral Daily    aspirin  81 mg Oral Daily    clopidogrel  75 mg Oral Daily    lansoprazole  30 mg Oral Every Other Day    niacin  2,000 mg Oral Nightly    metoprolol tartrate  25 mg Oral BID     Continuous Infusions:   sodium chloride 75 mL/hr (02/07/18 2207)      Pertinent Info/Orders/Treatment Plan: Large hematoma a Left femoral site in cath lab - better this am. On room air. Telemetry, I&O, daily weight, n/v checks, wound care. IVF, norvasc, asa, plavix, prevacid, lopressor. Diet: DIET CARDIAC;   DVT Prophylaxis: none - primary RN Skyler notified  Smoking status:  reports that he quit smoking about 7 months ago. His smoking use included Cigarettes. He has a 42.75 pack-year smoking history.  He has never used smokeless tobacco.   Influenza Vaccination Screening Completed: yes, refused  Pneumonia Vaccination Screening Completed: yes, refused  Core measures: monitor  PCP: Deya Aldana MD  Readmission:   no  Risk Score: 9.5     Discharge Planning  Current Residence:  Private Residence  Living Arrangements:  Spouse/Significant Other   Support Systems:  Spouse/Significant Other, Children  Current Services PTA:     Potential Assistance Needed:  N/A  Potential Assistance Purchasing Medications:     Does patient want to participate in local refill/ meds to beds program?     Type of Home Care Services:  None  Patient expects to be discharged to:  home  Expected Discharge date: Follow Up Appointment: Best Day/ Time:      Discharge Plan: Spoke with David; states he lives at home with his wife and did not use any DME or have any HH services PTA. David states he plans to return home with his wife at discharge and denies any needs.       Evaluation: no

## 2018-02-08 NOTE — TELEPHONE ENCOUNTER
Patient is calling in he wants to cancel his appt w/ Gilles Dry and needs to reschdule w/ Dr Usman Mitchell. He is leaving for [place] on 2/14/18 for a couple weeks and was supposed to be followed up from a procedure.   Please advise

## 2018-02-08 NOTE — PROGRESS NOTES
Care resumed from MOSES Boucher RN  Site looks good with no more bleeding or hematoma   Remains on bedrest   1700 Dr Tanner in to see pt and to assess site   1717 Percocet 1 tab for complaints chronic back pain   1800 \"feels better\"

## 2018-02-08 NOTE — DISCHARGE SUMMARY
pupils equal, round, and reactive to light  Neck: supple and non-tender without mass, no thyromegaly   Musculoskeletal: normal range of motion, no joint swelling, deformity or tenderness  Neurological: alert, oriented, normal speech, no focal findings or movement disorder noted    Medications:  Current Discharge Medication List      CONTINUE these medications which have NOT CHANGED    Details   clopidogrel (PLAVIX) 75 MG tablet TAKE 1 TABLET BY MOUTH DAILY  Qty: 30 tablet, Refills: 4      Multiple Vitamins-Minerals (MULTIVITAMIN ADULTS 50+ PO) Take by mouth daily      Cholecalciferol (VITAMIN D3) 1000 UNITS CAPS Take by mouth daily      metFORMIN (GLUCOPHAGE) 500 MG tablet Take 500 mg by mouth 2 times daily       Niacin ER 1000 MG TBCR Take 2 tablets by mouth nightly       lovastatin (MEVACOR) 20 MG tablet Take 20 mg by mouth nightly      amLODIPine (NORVASC) 10 MG tablet Take 10 mg by mouth daily      aspirin 81 MG EC tablet Take 81 mg by mouth daily      lansoprazole (PREVACID SOLUTAB) 30 MG disintegrating tablet Take 30 mg by mouth every other day       metoprolol tartrate (LOPRESSOR) 50 MG tablet Take 50 mg by mouth 2 times daily Take 1/2 tab po bid              Significant Diagnostics:   Radiology: No results found.     Labs:   Recent Results (from the past 72 hour(s))   APTT    Collection Time: 02/07/18  6:54 AM   Result Value Ref Range    aPTT 34.3 22.0 - 38.0 seconds   CBC    Collection Time: 02/07/18  6:54 AM   Result Value Ref Range    WBC 8.5 4.8 - 10.8 thou/mm3    RBC 4.10 (L) 4.70 - 6.10 mill/mm3    Hemoglobin 14.1 14.0 - 18.0 gm/dl    Hematocrit 40.0 (L) 42.0 - 52.0 %    MCV 97.5 (H) 80.0 - 94.0 fL    MCH 34.4 (H) 27.0 - 31.0 pg    MCHC 35.2 33.0 - 37.0 gm/dl    RDW 13.3 11.5 - 14.5 %    Platelets 064 174 - 108 thou/mm3    MPV 10.5 (H) 7.4 - 10.4 fL   Creatinine, Serum    Collection Time: 02/07/18  6:54 AM   Result Value Ref Range    CREATININE 0.7 0.4 - 1.2 mg/dL   Protime-INR    Collection Time:

## 2018-02-12 NOTE — PROGRESS NOTES
(PREVACID SOLUTAB) 30 MG disintegrating tablet Take 30 mg by mouth every other day       metoprolol tartrate (LOPRESSOR) 50 MG tablet Take 50 mg by mouth 2 times daily Take 1/2 tab po bid                PHYSICAL:     /62   Pulse 77   Temp 97.6 °F (36.4 °C) (Oral)   Resp 16   Ht 5' 8\" (1.727 m)   Wt 197 lb (89.4 kg)   SpO2 91%   BMI 29.95 kg/m²     Heart:  [x]Regular rate and rhythm  []Other:    Lungs:  [x]Clear    []Other:    Abdomen: [x]Soft    []Other:    Mental Status: [x]Alert & Oriented  []Other:   Ext:                [x]No edema       []Other:         No results for input(s): CKTOTAL, CKMB, CKMBINDEX, TROPONINI in the last 72 hours. Lab Results   Component Value Date    WBC 8.5 02/07/2018    RBC 4.10 02/07/2018    HGB 14.1 02/07/2018    HCT 40.0 02/07/2018    MCV 97.5 02/07/2018    MCH 34.4 02/07/2018    MCHC 35.2 02/07/2018    RDW 13.3 02/07/2018     02/07/2018    MPV 10.5 02/07/2018       Lab Results   Component Value Date     02/14/2017    K 5.1 02/14/2017     02/14/2017    CO2 27 02/14/2017    BUN 13 02/14/2017    CREATININE 0.7 02/07/2018    CALCIUM 9.6 02/14/2017    LABGLOM >90 02/07/2018    GLUCOSE 108 02/14/2017       No results found for: ALKPHOS, ALT, AST, PROT, BILITOT, BILIDIR, IBILI, LABALBU    No results found for: MG    No components found for: PTPATWAR, PTINRWAR    No results found for: LABA1C    No results found for: TRIG, HDL, LDLCALC, LDLDIRECT, LABVLDL    No results found for: TSH         SEDATION  Planned agent:[x]Midazolam []Meperidine [x]Sublimaze []Morphine []Diazepam  []Other:         ASA Classification:  []1 [x]2 []3 []4 []5  Class 1: A normal healthy patient  Class 2: Pt with mild to moderate systemic disease  Class 3: Severe systemic disease or disturbance  Class 4: Severe systemic disorders that are already life threatening. Class 5: Moribund pt with little chances of survival, for more than 24 hours.     Mallampati I Airway Classification:   []1 [x]2 []3 []4      [x]Pre-procedure diagnostic studies complete and results available. Comment:    [x]Previous sedation/anesthesia experiences assessed. Comment:    [x]The patient is an appropriate candidate to undergo the planned procedure sedation and anesthesia. (Refer to nursing sedation/analgesia documentation record)  [x]Formulation and discussion of sedation/procedure plan, risks, and expectations with patient and/or responsible adult completed. [x]Patient examined immediately prior to the procedure.  (Refer to nursing sedation/analgesia documentation record)        Nathan Britton MD, KIMMIE Gunn  Electronically signed 2/11/2018 at 8:46 PM

## 2018-03-06 ENCOUNTER — OFFICE VISIT (OUTPATIENT)
Dept: CARDIOLOGY CLINIC | Age: 73
End: 2018-03-06
Payer: MEDICARE

## 2018-03-06 VITALS
SYSTOLIC BLOOD PRESSURE: 136 MMHG | WEIGHT: 209.2 LBS | BODY MASS INDEX: 31.71 KG/M2 | HEIGHT: 68 IN | HEART RATE: 84 BPM | DIASTOLIC BLOOD PRESSURE: 70 MMHG

## 2018-03-06 DIAGNOSIS — Z87.891 EX-SMOKER: ICD-10-CM

## 2018-03-06 DIAGNOSIS — I10 ESSENTIAL HYPERTENSION: ICD-10-CM

## 2018-03-06 DIAGNOSIS — E78.5 DYSLIPIDEMIA: ICD-10-CM

## 2018-03-06 DIAGNOSIS — I25.10 CORONARY ARTERY DISEASE INVOLVING NATIVE CORONARY ARTERY OF NATIVE HEART WITHOUT ANGINA PECTORIS: Primary | ICD-10-CM

## 2018-03-06 DIAGNOSIS — Z95.1 HX OF CABG: ICD-10-CM

## 2018-03-06 DIAGNOSIS — I73.9 PERIPHERAL VASCULAR DISEASE (HCC): ICD-10-CM

## 2018-03-06 PROCEDURE — G8484 FLU IMMUNIZE NO ADMIN: HCPCS | Performed by: INTERNAL MEDICINE

## 2018-03-06 PROCEDURE — G8598 ASA/ANTIPLAT THER USED: HCPCS | Performed by: INTERNAL MEDICINE

## 2018-03-06 PROCEDURE — 99213 OFFICE O/P EST LOW 20 MIN: CPT | Performed by: INTERNAL MEDICINE

## 2018-03-06 PROCEDURE — G8427 DOCREV CUR MEDS BY ELIG CLIN: HCPCS | Performed by: INTERNAL MEDICINE

## 2018-03-06 PROCEDURE — 1123F ACP DISCUSS/DSCN MKR DOCD: CPT | Performed by: INTERNAL MEDICINE

## 2018-03-06 PROCEDURE — 1036F TOBACCO NON-USER: CPT | Performed by: INTERNAL MEDICINE

## 2018-03-06 PROCEDURE — 3017F COLORECTAL CA SCREEN DOC REV: CPT | Performed by: INTERNAL MEDICINE

## 2018-03-06 PROCEDURE — G8417 CALC BMI ABV UP PARAM F/U: HCPCS | Performed by: INTERNAL MEDICINE

## 2018-03-06 PROCEDURE — 4040F PNEUMOC VAC/ADMIN/RCVD: CPT | Performed by: INTERNAL MEDICINE

## 2018-03-06 NOTE — PROGRESS NOTES
PSV cm/sec POP A DIST ---->40 PSV cm/sec PTA --------------->49 PSV cm/sec PERONEAL ----->43 PSV cm/sec GELA ----------------> 35 PSV cm/sec GELA DIST ------>--34PSV cm/sec Retrograde flow LEFT ARTERY (PSV cm/sec) ? ????????????????????????? CFA ---------------> 109 PSV cm/sec PROF -------------> 90 PSV cm/sec SFA PROX ------->105 PSV cm/sec SFA MID ---------->134 PSV cm/sec SFA DIST -------->91 PSV cm/sec POP A PROX --->72 PSV cm/sec POP A DIST ---->70 PSV cm/sec PTA --------------->66 PSV cm/sec PERONEAL ----->60 PSV cm/sec GELA ----------------> 43 PSV cm/sec CALDERON RIGHT GELA----->0.88 PTA----->0.81 TBI------>0.52 CALDERON LEFT GELA----->0.92 PTA----->0.92 TBI------>0.64     1. High-grade stenosis of the distal aspect of the proximal right SFA stent. Further evaluation with CT or conventional angiography can be performed. 2. There is occlusion at the mid left anterior tibial artery. 3. Mildly diminished ankle brachial indices bilaterally. **This report has been created using voice recognition software. It may contain minor errors which are inherent in voice recognition technology. ** Final report electronically signed by Dr. Natasha Nova on 1/4/2018 12:38 PM      [facility] Abdominal Serialogram    Result Date: 2/9/2018  CLINICAL INFORMATION: Mr. Seven Gordon was recently evaluated by me for claudication involving the right lower extremity. I inserted a stent in the right proximal SFA a year ago. The patient had abnormal ultrasound of right lower arterial system. I met with him in the office and we discussed the diagnostic options and management he agreed to proceed with angiography of the peripheral vascular tree of the lower extremities. COMPARISON: No prior study. TECHNIQUE: After informed written consent was obtained the patient was brought to the special procedure laboratory in no apparent distress. 2 mg of Versed and 50 mcg of fentanyl were administered intravenously.  The left groin was prepped and draped in a sterile knee. The procedure was ended and the left femoral sheath was secured. Patient tolerated procedure very well and he was sent back to his room in stable condition FINDINGS: The abdominal aorta is non-aneurysmal and patent. Right renal artery is patent. Left renal artery is patent. Right common iliac artery has mild ds. Right external iliac artery has mild ds. . Right common femoral artery has mild ds. Right superficial femoral artery has diffuse disease. Patient had 90% stenosis of the distal age of the stent and 70% stenoses in the middle of the stent in the proximal SFA. Right popliteal artery has mild ds. Right anterior tibial artery is patent. Right tibioperoneal trunk is patent. Right posterior tibial artery is patent. Right peroneal artery is small and I don't see it reaching to the ankle. Left common iliac artery has mild ds. Left external iliac artery has 60% stenosis. Pressure gradient across the lesion is 15 mmHg. Guerda Kang Left common femoral artery has mild ds. Left superficial femoral artery has diffuse disease. Left popliteal artery has mild ds. Left anterior tibial artery is patent. Left tibioperoneal trunk is patent. Left posterior tibial artery is patent. Left peroneal artery is patent. Successful intervention of the right proximal SFA stent 90% restenosis with 6.0-20 mm cutting balloon and 6 x 60 mm drug coated balloon angioplasty. Left external iliac artery has 60% stenosis. The pressure gradient of 15 mmHg. The patient denies any claudication in left leg. No need for any intervention at this time. Below the knee patient has PRACHI III flow in all vessels. For now, continue optimal medical management with ASA/plavix. Final report electronically signed by Dr. Catrachito Deleon on 2/9/2018 8:52 PM      Assessment/Plan:      Peripheral vascular disease  H/o intervention with 6.0-40 mm stent insertion and angioplasty for right SFA 90% proximal lesion.   Patient was having a claudication in the right leg.  Ultrasound showed high-grade stenosis of the distal right SFA stent. I recommended lower extremity angiogram to assess peripheral vascular disease and stent patency. Patient was found to have significant restenosis of the stent in the right SFA. Successful intervention of the right proximal SFA stent 90% restenosis with 6.0-20 mm cutting balloon and 6 x 60 mm drug coated balloon angioplasty. No more claudication. Continue current management. Coronary artery disease  CABG  Seems to be stable. Denies angina or change in breathing pattern. Continue ASA/BB/Statin/norvasc/plavix. Hypertension, on medical treatment. Seems to be under good control. Patient is compliant with medical treatment. Dyslipidemia: On statin, followed periodically. Patient need periodic lipid and liver profile. Patient is a advised to have their lipids and liver panel checked through family doctor. The therapeutic values that need to be met are also presented to the patient and need to achieve them is emphasized and patient agrees and accepts. Will schedule follow up appointment in 6 months.

## 2018-06-26 ENCOUNTER — TELEPHONE (OUTPATIENT)
Dept: CARDIOLOGY CLINIC | Age: 73
End: 2018-06-26

## 2018-06-26 NOTE — TELEPHONE ENCOUNTER
Pt called needs clearance      Pre op Risk Assessment    Procedure Skin Cancer removed from lip  Physician Dr. Rohit Hernandez  Date of surgery/procedure  7/30/18    Last OV 3/6/18  Last Stress not in epic  Last Echo not in epic  Last Cath  aortagram abdominal  serialogram 2/7/18  Last Stent 2/14/17  Is patient on blood thinners ASA and Plavix   Hold Meds/how many days 7-10 days? Dr. Janie Hernandez is pt cleared  For surgery?

## 2018-10-23 ENCOUNTER — OFFICE VISIT (OUTPATIENT)
Dept: CARDIOLOGY CLINIC | Age: 73
End: 2018-10-23
Payer: MEDICARE

## 2018-10-23 VITALS
DIASTOLIC BLOOD PRESSURE: 70 MMHG | SYSTOLIC BLOOD PRESSURE: 129 MMHG | BODY MASS INDEX: 31.78 KG/M2 | HEART RATE: 74 BPM | WEIGHT: 209 LBS

## 2018-10-23 DIAGNOSIS — I73.9 PERIPHERAL VASCULAR DISEASE (HCC): ICD-10-CM

## 2018-10-23 DIAGNOSIS — E78.5 DYSLIPIDEMIA: ICD-10-CM

## 2018-10-23 DIAGNOSIS — R01.1 SYSTOLIC EJECTION MURMUR: ICD-10-CM

## 2018-10-23 DIAGNOSIS — I25.10 CORONARY ARTERY DISEASE INVOLVING NATIVE HEART WITHOUT ANGINA PECTORIS, UNSPECIFIED VESSEL OR LESION TYPE: Primary | ICD-10-CM

## 2018-10-23 DIAGNOSIS — I10 ESSENTIAL HYPERTENSION: ICD-10-CM

## 2018-10-23 PROCEDURE — 93000 ELECTROCARDIOGRAM COMPLETE: CPT | Performed by: INTERNAL MEDICINE

## 2018-10-23 PROCEDURE — 99214 OFFICE O/P EST MOD 30 MIN: CPT | Performed by: INTERNAL MEDICINE

## 2018-10-23 PROCEDURE — 4040F PNEUMOC VAC/ADMIN/RCVD: CPT | Performed by: INTERNAL MEDICINE

## 2018-10-23 PROCEDURE — 1101F PT FALLS ASSESS-DOCD LE1/YR: CPT | Performed by: INTERNAL MEDICINE

## 2018-10-23 PROCEDURE — 3017F COLORECTAL CA SCREEN DOC REV: CPT | Performed by: INTERNAL MEDICINE

## 2018-10-23 PROCEDURE — G8427 DOCREV CUR MEDS BY ELIG CLIN: HCPCS | Performed by: INTERNAL MEDICINE

## 2018-10-23 PROCEDURE — G8484 FLU IMMUNIZE NO ADMIN: HCPCS | Performed by: INTERNAL MEDICINE

## 2018-10-23 PROCEDURE — 1036F TOBACCO NON-USER: CPT | Performed by: INTERNAL MEDICINE

## 2018-10-23 PROCEDURE — G8598 ASA/ANTIPLAT THER USED: HCPCS | Performed by: INTERNAL MEDICINE

## 2018-10-23 PROCEDURE — 1123F ACP DISCUSS/DSCN MKR DOCD: CPT | Performed by: INTERNAL MEDICINE

## 2018-10-23 PROCEDURE — G8417 CALC BMI ABV UP PARAM F/U: HCPCS | Performed by: INTERNAL MEDICINE

## 2018-10-23 NOTE — PROGRESS NOTES
Cholecalciferol (VITAMIN D3) 1000 UNITS CAPS Take by mouth daily      metFORMIN (GLUCOPHAGE) 500 MG tablet Take 500 mg by mouth 2 times daily       Niacin ER 1000 MG TBCR Take 2 tablets by mouth nightly       lovastatin (MEVACOR) 20 MG tablet Take 20 mg by mouth nightly      amLODIPine (NORVASC) 10 MG tablet Take 10 mg by mouth daily      aspirin 81 MG EC tablet Take 81 mg by mouth daily      lansoprazole (PREVACID SOLUTAB) 30 MG disintegrating tablet Take 30 mg by mouth every other day       metoprolol tartrate (LOPRESSOR) 50 MG tablet Take 50 mg by mouth 2 times daily Take 1/2 tab po bid        No current facility-administered medications for this visit. Review of Systems -     General ROS: negative  Psychological ROS: negative  Hematological and Lymphatic ROS: No history of blood clots or bleeding disorder. Respiratory ROS: no cough, shortness of breath, or wheezing  Cardiovascular ROS: no chest pain or dyspnea on exertion  Gastrointestinal ROS: negative  Genito-Urinary ROS: no dysuria, trouble voiding, or hematuria  Musculoskeletal ROS: negative  Neurological ROS: no TIA or stroke symptoms  Dermatological ROS: negative      Blood pressure 129/70, pulse 74, weight 209 lb (94.8 kg).         Physical Examination:    General appearance - alert, well appearing, and in no distress  Mental status - alert, oriented to person, place, and time  Neck - supple, no significant adenopathy, no JVD, or carotid bruits  Chest - clear to auscultation, no wheezes, rales or rhonchi, symmetric air entry  Heart - normal rate, regular rhythm, normal S1, S2, no murmurs, rubs, clicks or gallops  Abdomen - soft, nontender, nondistended, no masses or organomegaly  Neurological - alert, oriented, normal speech, no focal findings or movement disorder noted  Musculoskeletal - no joint tenderness, deformity or swelling  Extremities - peripheral pulses normal, no pedal edema, no clubbing or cyanosis  Skin - normal coloration and

## 2018-10-24 ENCOUNTER — HOSPITAL ENCOUNTER (OUTPATIENT)
Dept: NON INVASIVE DIAGNOSTICS | Age: 73
Discharge: HOME | End: 2018-10-24
Payer: MEDICARE

## 2018-10-24 DIAGNOSIS — R01.1 SYSTOLIC EJECTION MURMUR: ICD-10-CM

## 2018-10-24 DIAGNOSIS — I25.10 CORONARY ARTERY DISEASE INVOLVING NATIVE HEART WITHOUT ANGINA PECTORIS, UNSPECIFIED VESSEL OR LESION TYPE: ICD-10-CM

## 2018-10-24 DIAGNOSIS — E78.5 DYSLIPIDEMIA: ICD-10-CM

## 2018-10-24 DIAGNOSIS — I73.9 PERIPHERAL VASCULAR DISEASE (HCC): ICD-10-CM

## 2018-10-24 DIAGNOSIS — I10 ESSENTIAL HYPERTENSION: ICD-10-CM

## 2018-10-24 LAB
LV EF: 65 %
LVEF MODALITY: NORMAL

## 2018-10-24 PROCEDURE — 93306 TTE W/DOPPLER COMPLETE: CPT
